# Patient Record
Sex: MALE | Race: BLACK OR AFRICAN AMERICAN | Employment: FULL TIME | URBAN - METROPOLITAN AREA
[De-identification: names, ages, dates, MRNs, and addresses within clinical notes are randomized per-mention and may not be internally consistent; named-entity substitution may affect disease eponyms.]

---

## 2021-09-12 ENCOUNTER — HOSPITAL ENCOUNTER (EMERGENCY)
Age: 31
Discharge: HOME | End: 2021-09-13
Attending: EMERGENCY MEDICINE
Payer: MEDICAID

## 2021-09-12 ENCOUNTER — HOSPITAL ENCOUNTER (EMERGENCY)
Age: 31
Discharge: LEFT BEFORE DISCHARGE | End: 2021-09-12
Attending: STUDENT IN AN ORGANIZED HEALTH CARE EDUCATION/TRAINING PROGRAM
Payer: MEDICAID

## 2021-09-12 VITALS
BODY MASS INDEX: 25.67 KG/M2 | TEMPERATURE: 98.8 F | HEIGHT: 74 IN | DIASTOLIC BLOOD PRESSURE: 75 MMHG | HEART RATE: 74 BPM | WEIGHT: 200 LBS | OXYGEN SATURATION: 100 % | SYSTOLIC BLOOD PRESSURE: 121 MMHG | RESPIRATION RATE: 16 BRPM

## 2021-09-12 VITALS
TEMPERATURE: 98.4 F | DIASTOLIC BLOOD PRESSURE: 64 MMHG | WEIGHT: 190 LBS | SYSTOLIC BLOOD PRESSURE: 120 MMHG | BODY MASS INDEX: 24.38 KG/M2 | OXYGEN SATURATION: 99 % | RESPIRATION RATE: 18 BRPM | HEART RATE: 107 BPM | HEIGHT: 74 IN

## 2021-09-12 DIAGNOSIS — S20.211A CONTUSION OF RIB ON RIGHT SIDE, INITIAL ENCOUNTER: Primary | ICD-10-CM

## 2021-09-12 PROCEDURE — 75810000275 HC EMERGENCY DEPT VISIT NO LEVEL OF CARE

## 2021-09-12 PROCEDURE — 99283 EMERGENCY DEPT VISIT LOW MDM: CPT

## 2021-09-12 RX ORDER — KETOROLAC TROMETHAMINE 30 MG/ML
30 INJECTION, SOLUTION INTRAMUSCULAR; INTRAVENOUS ONCE
Status: DISCONTINUED | OUTPATIENT
Start: 2021-09-13 | End: 2021-09-13
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
## 2021-09-13 ENCOUNTER — APPOINTMENT (OUTPATIENT)
Dept: GENERAL RADIOLOGY | Age: 31
End: 2021-09-13
Attending: EMERGENCY MEDICINE
Payer: MEDICAID

## 2021-09-13 PROCEDURE — 74011250637 HC RX REV CODE- 250/637: Performed by: EMERGENCY MEDICINE

## 2021-09-13 PROCEDURE — 71100 X-RAY EXAM RIBS UNI 2 VIEWS: CPT

## 2021-09-13 RX ORDER — IBUPROFEN 800 MG/1
800 TABLET ORAL ONCE
Status: COMPLETED | OUTPATIENT
Start: 2021-09-13 | End: 2021-09-13

## 2021-09-13 RX ORDER — IBUPROFEN 800 MG/1
800 TABLET ORAL
Qty: 20 TABLET | Refills: 0 | Status: SHIPPED | OUTPATIENT
Start: 2021-09-13 | End: 2021-09-20

## 2021-09-13 RX ORDER — CYCLOBENZAPRINE HCL 5 MG
5 TABLET ORAL 2 TIMES DAILY
Qty: 10 TABLET | Refills: 0 | Status: SHIPPED | OUTPATIENT
Start: 2021-09-13

## 2021-09-13 RX ADMIN — IBUPROFEN 800 MG: 800 TABLET, FILM COATED ORAL at 00:19
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
## 2022-06-08 ENCOUNTER — HOSPITAL ENCOUNTER (EMERGENCY)
Age: 32
Discharge: HOME | End: 2022-06-08
Attending: STUDENT IN AN ORGANIZED HEALTH CARE EDUCATION/TRAINING PROGRAM
Payer: MEDICAID

## 2022-06-08 VITALS
HEIGHT: 75 IN | DIASTOLIC BLOOD PRESSURE: 82 MMHG | WEIGHT: 180 LBS | OXYGEN SATURATION: 99 % | BODY MASS INDEX: 22.38 KG/M2 | SYSTOLIC BLOOD PRESSURE: 128 MMHG | RESPIRATION RATE: 18 BRPM | HEART RATE: 93 BPM | TEMPERATURE: 98.1 F

## 2022-06-08 DIAGNOSIS — K04.7 PERIAPICAL ABSCESS: Primary | ICD-10-CM

## 2022-06-08 PROCEDURE — 74011250637 HC RX REV CODE- 250/637: Performed by: STUDENT IN AN ORGANIZED HEALTH CARE EDUCATION/TRAINING PROGRAM

## 2022-06-08 PROCEDURE — 99283 EMERGENCY DEPT VISIT LOW MDM: CPT

## 2022-06-08 RX ORDER — HYDROCODONE BITARTRATE AND ACETAMINOPHEN 5; 325 MG/1; MG/1
1 TABLET ORAL
Qty: 12 TABLET | Refills: 0 | Status: SHIPPED | OUTPATIENT
Start: 2022-06-08 | End: 2022-06-11

## 2022-06-08 RX ORDER — AMOXICILLIN 500 MG/1
500 TABLET, FILM COATED ORAL 2 TIMES DAILY
Qty: 14 TABLET | Refills: 0 | Status: SHIPPED | OUTPATIENT
Start: 2022-06-08 | End: 2022-06-15

## 2022-06-08 RX ORDER — HYDROCODONE BITARTRATE AND ACETAMINOPHEN 5; 325 MG/1; MG/1
1 TABLET ORAL ONCE
Status: COMPLETED | OUTPATIENT
Start: 2022-06-08 | End: 2022-06-08

## 2022-06-08 RX ADMIN — HYDROCODONE BITARTRATE AND ACETAMINOPHEN 1 TABLET: 5; 325 TABLET ORAL at 13:46

## 2022-06-08 NOTE — DISCHARGE INSTRUCTIONS
Thank you! Thank you for allowing me to care for you in the emergency department. I sincerely hope that you are satisfied with your visit today. It is my goal to provide you with excellent care. Below you will find a list of your labs and imaging from your visit today if applicable. Should you have any questions regarding these results please do not hesitate to call the emergency department. Please review Sign[employer]d for a more detailed result list since the below list may not be comprehensive. Instructions on how to sign up to Signifyd should be provided in this packet. Labs -   No results found for this or any previous visit (from the past 12 hour(s)). Radiologic Studies -   No orders to display     CT Results  (Last 48 hours)      None          CXR Results  (Last 48 hours)      None               If you feel that you have not received excellent quality care or timely care, please ask to speak to the nurse manager. Please choose us in the future for your continued health care needs. ------------------------------------------------------------------------------------------------------------  The exam and treatment you received in the Emergency Department were for an urgent problem and are not intended as complete care. It is important that you follow-up with a doctor, nurse practitioner, or physician assistant to:  (1) confirm your diagnosis,  (2) re-evaluation of changes in your illness and treatment, and  (3) for ongoing care. If your symptoms become worse or you do not improve as expected and you are unable to reach your usual health care provider, you should return to the Emergency Department. We are available 24 hours a day. Please take your discharge instructions with you when you go to your follow-up appointment. If a prescription has been provided, please have it filled as soon as possible to prevent a delay in treatment.  Read the entire medication instruction sheet provided to you by the pharmacy. If you have any questions or reservations about taking the medication due to side effects or interactions with other medications, please call your primary care physician or contact the ER to speak with the charge nurse. Make an appointment with your family doctor or the physician you were referred to for follow-up of this visit as instructed on your discharge paperwork, as this is a mandatory follow-up. Return to the ER if you are unable to be seen or if you are unable to be seen in a timely manner. If you have any problem arranging the follow-up visit, contact the Emergency Department immediately.

## 2022-06-08 NOTE — ED PROVIDER NOTES
[id number]  EMERGENCY DEPARTMENT ENCOUNTER NOTE    Date: 6/8/2022  Patient Name: Manish Santoyo    History of Presenting Illness     Chief Complaint   Patient presents with    Dental Pain     HPI: Manish Santoyo, 32 y.o. male with a past medical history and outpatient medications as listed and reviewed below  presents for dental pain. Patient reports a 2 day history of dental pain. It is localized to tooth 3. Pain is described as aching and severe preventing sleep. Had a filling that fell down a month ago and started developing the pain over the past few days. Patient denies sublingual fullness, shortness of breath, stridor, fevers, chills, vomiting, neck stiffness, and inability to tolerate PO intake. Medications taken prior to presentation: ibuprofen without relief. Medical History   I reviewed the medical, surgical, family, and social history, as well as allergies:    PCP: Sharon Lopez MD    Past Medical History:  No past medical history on file. Past Surgical History:  No past surgical history on file. Current Outpatient Medications:  Current Outpatient Medications   Medication Instructions    amoxicillin 500 mg, Oral, 2 TIMES DAILY    cyclobenzaprine (FLEXERIL) 5 mg, Oral, 2 TIMES DAILY    HYDROcodone-acetaminophen (Norco) 5-325 mg per tablet 1 Tablet, Oral, EVERY 6 HOURS AS NEEDED      Family History:  No family history on file. Social History:  Social History     Tobacco Use    Smoking status: Current Some Day Smoker    Smokeless tobacco: Never Used    Tobacco comment: black and louise   Substance Use Topics    Alcohol use: Not Currently    Drug use: Not Currently     Allergies:  No Known Allergies    Review of Systems     All other symptoms negative. Physical Exam and Vital Signs   Vital Signs - Reviewed the patient's vital signs.     Patient Vitals for the past 12 hrs:   Temp Pulse Resp BP SpO2   06/08/22 1323 98.1 °F (36.7 °C) 93 18 128/82 99 % Physical Exam:    GENERAL: not in apparent distress  HEENT  * Pupils equal, head atraumatic  * Normal voice, no drooling, no stridor  * No facial swelling, erythema, or cellulitis  * No sublingual fullness  * No aphthous ulcers  * Noted tooth 3 periapical loading tenderness  * Normal uvula without deviation  * No tonsillitis or evidence of abscess  * No pharyngeal erythema  CV:  * warm extremities  * no cyanosis  PULMONARY: no respiratory distress, non labored breathing, no audible wheezing or stridor, no accessory muscle use  ABDOMEN: soft, moving in bed and pulls to seated position without grimace or pain  EXTREMITIES/BACK: moving four extremities without limitation  SKIN: no rashes or signs of trauma  NEURO:  * Speech clear  * GCS 15    Medical Decision Making   - I am the first and primary provider for this patient and am the primary provider of record. - I reviewed the vital signs, available nursing notes, past medical history, past surgical history, family history and social history. - Initial assessment performed. The patients presenting problems have been discussed, and the staff are in agreement with the care plan formulated and outlined with them. I have encouraged them to ask questions as they arise throughout their visit. - Available medical records, nursing notes, old EKGs, and EMS run sheets (if patient was EMS transported) were reviewed    MDM:   Patient is a 32 y.o. male presenting for dental pain. Vitals reveal no significant abnormalities and physical exam reveals periapical abscess. Based on the history, physical exam, risk factors, and vitals signs, I favor the following differential diagnoses: Periapical abscess, tooth abscess, toothache, gingivitis. No concern for Jessee's angina due to the absence of sublingual fullness.  No concern for pharyngitis, or retropharyngeal, peritonsillar, or facial abscess due to non-suggestive physical exam.     Results     Labs:  No results found for this or any previous visit (from the past 12 hour(s)). Radiologic Studies:  CT Results  (Last 48 hours)    None        CXR Results  (Last 48 hours)    None        Medications ordered:  Medications   HYDROcodone-acetaminophen (NORCO) 5-325 mg per tablet 1 Tablet (1 Tablet Oral Given 6/8/22 9777)     ED Course and Reassessment     ED Course:          Reassessment:    Patient presents with a picture of a simple periapical abscess. No concern for Jessee's angina due to the absence of sublingual fullness. No concern for pharyngitis, or retropharyngeal, peritonsillar, or facial abscess due to non-suggestive physical exam.     Will provide patient with symptomatic treatment, antibiotics, and dentistry follow up with return precaution. The patient was told that if they do not follow with dentistry for management the condition may get worse and progress to fulminant infections that may compromise airway and cause significant systemic infections. Patient is tolerating PO intake at time of discharge. Understanding was insured that at this time there is no evidence for a more malignant underlying process, but that early in the process of an illness, an emergency department workup can be falsely reassuring. Routine discharge counseling was given including the fact that any worsening, changing or persistent symptoms should prompt an immediate call or follow up with their primary physician or the emergency department. The importance of appropriate follow up was also discussed. More extensive discharge instructions were given in the patient's discharge paperwork. After completion of evaluation and discussion of results and diagnoses, all the questions were answered. If required, all follow up appointments and treatments were discussed and explained. Understanding was insured prior to discharge.     Final Disposition     DISCHARGED FROM EMERGENCY DEPARTMENT    Patient will be discharged from the Emergency Department in stable condition. All of the diagnostic tests were reviewed and any questions were answered. Diagnosis, results, follow up if applicable, and return precautions were discussed. I have also put together printed discharge instructions for them that include: 1) educational information regarding their diagnosis, 2) how to care for their diagnosis at home, as well a 3) list of reasons why they would want to return to the ED prior to their follow-up appointment, should their condition change. Any labs or imaging done in the ED will be either printed with the discharge paperwork or available through [facility] [address] DISCHARGE PLAN:  1. Current Discharge Medication List      START taking these medications    Details   amoxicillin 500 mg tab Take 500 mg by mouth two (2) times a day for 7 days. Qty: 14 Tablet, Refills: 0      HYDROcodone-acetaminophen (Norco) 5-325 mg per tablet Take 1 Tablet by mouth every six (6) hours as needed for Pain for up to 3 days. Max Daily Amount: 4 Tablets. Qty: 12 Tablet, Refills: 0    Associated Diagnoses: Periapical abscess         CONTINUE these medications which have NOT CHANGED    Details   cyclobenzaprine (FLEXERIL) 5 mg tablet Take 1 Tablet by mouth two (2) times a day. Qty: 10 Tablet, Refills: 0            2.   Follow-up Information     Follow up With Specialties Details Why Contact Info    [address] [place] [address] Emergency Medicine Go to  If symptoms worsen [address] [place] [id number] [phone]    Your doctor  Schedule an appointment as soon as possible for a visit in 3 days      [address]  Schedule an appointment as soon as possible for a visit   [address] [place]  [phone]        3. Return to ED if worse    4. Current Discharge Medication List      START taking these medications    Details   amoxicillin 500 mg tab Take 500 mg by mouth two (2) times a day for 7 days.   Qty: 14 Tablet, Refills: 0  Start date: 6/8/2022, End date: 6/15/2022      HYDROcodone-acetaminophen (Norco) 5-325 mg per tablet Take 1 Tablet by mouth every six (6) hours as needed for Pain for up to 3 days. Max Daily Amount: 4 Tablets. Qty: 12 Tablet, Refills: 0  Start date: 6/8/2022, End date: 6/11/2022    Associated Diagnoses: Periapical abscess             Diagnosis     Clinical Impression:   1. Periapical abscess      Attestations:    Orlando Matos MD    Please note that this dictation was completed with [employer], the [email or web address] voice recognition software. Quite often unanticipated grammatical, syntax, homophones, and other interpretive errors are inadvertently transcribed by the computer software. Please disregard these errors. Please excuse any errors that have escaped final proofreading. Thank you.